# Patient Record
Sex: FEMALE | Race: WHITE | NOT HISPANIC OR LATINO | Employment: FULL TIME | URBAN - METROPOLITAN AREA
[De-identification: names, ages, dates, MRNs, and addresses within clinical notes are randomized per-mention and may not be internally consistent; named-entity substitution may affect disease eponyms.]

---

## 2020-07-13 PROCEDURE — U0003 INFECTIOUS AGENT DETECTION BY NUCLEIC ACID (DNA OR RNA); SEVERE ACUTE RESPIRATORY SYNDROME CORONAVIRUS 2 (SARS-COV-2) (CORONAVIRUS DISEASE [COVID-19]), AMPLIFIED PROBE TECHNIQUE, MAKING USE OF HIGH THROUGHPUT TECHNOLOGIES AS DESCRIBED BY CMS-2020-01-R: HCPCS | Performed by: NURSE PRACTITIONER

## 2020-07-16 ENCOUNTER — TELEPHONE (OUTPATIENT)
Dept: URGENT CARE | Facility: CLINIC | Age: 25
End: 2020-07-16

## 2020-09-23 ENCOUNTER — TRANSCRIBE ORDERS (OUTPATIENT)
Dept: ADMINISTRATIVE | Facility: HOSPITAL | Age: 25
End: 2020-09-23

## 2020-09-23 DIAGNOSIS — R10.32 LLQ ABDOMINAL PAIN: Primary | ICD-10-CM

## 2020-09-30 ENCOUNTER — HOSPITAL ENCOUNTER (OUTPATIENT)
Dept: CT IMAGING | Facility: HOSPITAL | Age: 25
Discharge: HOME | End: 2020-09-30
Admitting: NURSE PRACTITIONER

## 2020-09-30 DIAGNOSIS — R10.32 LLQ ABDOMINAL PAIN: ICD-10-CM

## 2020-09-30 PROCEDURE — 74176 CT ABD & PELVIS W/O CONTRAST: CPT

## 2020-09-30 PROCEDURE — 0 DIATRIZOATE MEGLUMINE & SODIUM PER 1 ML: Performed by: NURSE PRACTITIONER

## 2020-09-30 RX ADMIN — DIATRIZOATE MEGLUMINE AND DIATRIZOATE SODIUM 15 ML: 660; 100 LIQUID ORAL; RECTAL at 15:16

## 2021-12-10 ENCOUNTER — OFFICE VISIT (OUTPATIENT)
Dept: CARDIOLOGY | Facility: CLINIC | Age: 26
End: 2021-12-10

## 2021-12-10 VITALS
OXYGEN SATURATION: 98 % | HEART RATE: 106 BPM | BODY MASS INDEX: 26.36 KG/M2 | HEIGHT: 69 IN | DIASTOLIC BLOOD PRESSURE: 80 MMHG | SYSTOLIC BLOOD PRESSURE: 116 MMHG | WEIGHT: 178 LBS

## 2021-12-10 DIAGNOSIS — I47.1 PAROXYSMAL SVT (SUPRAVENTRICULAR TACHYCARDIA) (HCC): ICD-10-CM

## 2021-12-10 DIAGNOSIS — R00.2 PALPITATIONS: ICD-10-CM

## 2021-12-10 DIAGNOSIS — R07.9 CHEST PAIN, UNSPECIFIED TYPE: Primary | ICD-10-CM

## 2021-12-10 DIAGNOSIS — R06.09 DOE (DYSPNEA ON EXERTION): ICD-10-CM

## 2021-12-10 PROCEDURE — 93000 ELECTROCARDIOGRAM COMPLETE: CPT

## 2021-12-10 PROCEDURE — 99204 OFFICE O/P NEW MOD 45 MIN: CPT | Performed by: INTERNAL MEDICINE

## 2021-12-10 RX ORDER — OMEPRAZOLE 40 MG/1
40 CAPSULE, DELAYED RELEASE ORAL DAILY
COMMUNITY
Start: 2021-10-06

## 2021-12-10 NOTE — PROGRESS NOTES
[facility]  Consultation H&P  Radha Baker  1995  [address] [id number]     VISIT DATE:  12/10/21    PCP: Yoanna Hanna, APRN  [address] [facility] [facility] [address]  [place] [phone]    IDENTIFICATION: A 26 y.o. female Born in [place] - current resident of [place].   - At [employer].     PROBLEM LIST:  1. Atypical Chest pain with SOB  2. Former Vaper   3. Malaise and fatigue  4. Surgical  1. [place] tooth extraction    CC:  Chief Complaint   Patient presents with   • Chest Pain   • Shortness of Breath   • Nausea   • Fatigue       Allergies  No Known Allergies    Current Medications    Current Outpatient Medications:   •  omeprazole (priLOSEC) 40 MG capsule, Take 40 mg by mouth Daily., Disp: , Rfl:      History of Present Illness   HPI  Radha Baker is a 26 y.o. year old female with the above mentioned PMH who presents for consult from Mitzy Vines MD for  evaluation of intermittent chest pain, tachypalpitations, and shortness of breath.    History today revealed that the patient has experienced intermittent episodes of tachypalpitations, chest pain, and shortness of breath that first started in October 2021.  Notably, the patient stated that she had 1 severe episode of chest pain, tachypalpitations, and shortness of breath in early October without any precipitating/exacerbating factors.  Regarding this first episode, the patient stated that it was severe in nature.  She stated that it was so severe that she required PCP evaluation, lasted around 1 to 2 days, and has persisted intermittently (around 4 times) since the episode in early October.  Notably, the patient denies any significant or pervasive shortness of breath, chest pain, and  tachypalpitations outside of these episodes (with exception to physical exertion).    Regarding cardiovascular history, the patient states that she previously vaped (discontinued around 1 year ago) and  "has a family history of tachycardia (not known if treated or not).    Notably, the patient also appreciates a shortness of breath upon moderate to severe physical exertion.  She states that upon moderate to severe physical exertion she becomes markedly dyspneic requiring her to slow down.    Pt denies any dyspnea at rest, orthopnea, PND, lower extremity edema, or claudication. Pt denies history of CHF, DVT, PE, MI, CVA, TIA, or rheumatic fever.       ROS  ROS    SOCIAL HX  Social History     Socioeconomic History   • Marital status: Single   Tobacco Use   • Smoking status: Never Smoker   • Smokeless tobacco: Never Used   Vaping Use   • Vaping Use: Former   Substance and Sexual Activity   • Alcohol use: Yes     Comment: soc.   • Drug use: Never   • Sexual activity: Defer       FAMILY HX  Family History   Problem Relation Age of Onset   • Breast cancer Mother    • No Known Problems Sister    • No Known Problems Brother    • No Known Problems Brother        Vitals:    12/10/21 1401   BP: 116/80   BP Location: Right arm   Patient Position: Sitting   Pulse: 106   SpO2: 98%   Weight: 80.7 kg (178 lb)   Height: 174 cm (68.5\")     Body mass index is 26.67 kg/m².     PHYSICAL EXAMINATION:  Vitals and nursing note reviewed.   Constitutional:       General: Awake. Not in acute distress.     Appearance: Healthy appearance. Well-developed and not in distress.   Eyes:      General: No scleral icterus.     Conjunctiva/sclera: Conjunctivae normal.      Pupils: Pupils are equal, round, and reactive to light.   HENT:      Head: Normocephalic and atraumatic.      Nose: Nose normal.    Mouth/Throat:      Pharynx: Uvula midline.   Neck:      Thyroid: No thyromegaly.      Vascular: No carotid bruit.      Trachea: No tracheal deviation.      Lymphadenopathy: No cervical adenopathy.   Pulmonary:      Effort: Pulmonary effort is normal.      Breath sounds: Normal breath sounds. No wheezing. No rhonchi. No rales.   Cardiovascular:      " Normal rate. Regular rhythm. Normal S1. Normal S2.      Murmurs: There is no murmur.      No gallop. No click. No rub.   Pulses:     Intact distal pulses.   Edema:     Peripheral edema absent.   Abdominal:      General: Bowel sounds are normal.      Palpations: Abdomen is soft. There is no abdominal mass.      Tenderness: There is no abdominal tenderness. There is no guarding.   Musculoskeletal:         General: No tenderness.      Extremities: No clubbing present.     Cervical back: Normal range of motion and neck supple. Skin:     General: Skin is warm and dry. There is no cyanosis.      Findings: No erythema or rash.   Neurological:      Mental Status: Alert, oriented to person, place, and time and oriented to person, place and time.   Psychiatric:         Attention and Perception: Attention normal.         Mood and Affect: Mood normal.         Speech: Speech normal.         Behavior: Behavior normal. Behavior is cooperative.         Diagnostic Data:    ECG 12 Lead    Date/Time: 12/10/2021 2:48 PM  Performed by: Salvador Butler PA-C  Authorized by: Salvador Butler PA-C   Comparison: not compared with previous ECG   Rhythm: sinus rhythm  Rate: normal  Conduction: conduction normal  T inversion: aVR and V1  QRS axis: right  Other findings: non-specific ST-T wave changes    Clinical impression: normal ECG          No results found for: CHLPL, TRIG, HDL, LDLDIRECT  No results found for: GLUCOSE, BUN, CREATININE, NA, K, CL, CO2, CREATININE, BUN  No results found for: HGBA1C  No results found for: WBC, HGB, HCT, PLT    ASSESSMENT:   Diagnosis Plan   1. Chest pain, unspecified type     2. Palpitations     3. MUÑOZ (dyspnea on exertion)     4. Paroxysmal SVT (supraventricular tachycardia) ([facility])         PLAN:  1.  Chest pain/palpitations/MUÑOZ -due to the patient's history of intermittent episodes of chest pain, palpitations, MUÑOZ, we will order a 2D echo to assess for heart structure and function.  If no noted abnormality upon 2D  echo, will consider Holter monitor to assess for breakthrough/episodes of tachyarrhythmia.      Mitzy Vines MD, thank you for referring Ms. Baker for evaluation.  I have forwarded my electronically generated recommendations to you for review.  Please do not hesitate to call with any questions.      Scribe: Salvador Butler PA-C for Dr. Florian Stokes M.D. [facility]